# Patient Record
Sex: FEMALE | Race: WHITE | NOT HISPANIC OR LATINO | Employment: OTHER | URBAN - METROPOLITAN AREA
[De-identification: names, ages, dates, MRNs, and addresses within clinical notes are randomized per-mention and may not be internally consistent; named-entity substitution may affect disease eponyms.]

---

## 2024-06-17 ENCOUNTER — OFFICE VISIT (OUTPATIENT)
Age: 59
End: 2024-06-17
Payer: COMMERCIAL

## 2024-06-17 ENCOUNTER — APPOINTMENT (OUTPATIENT)
Age: 59
End: 2024-06-17
Payer: COMMERCIAL

## 2024-06-17 VITALS
TEMPERATURE: 97.7 F | DIASTOLIC BLOOD PRESSURE: 96 MMHG | HEIGHT: 65 IN | HEART RATE: 90 BPM | WEIGHT: 139.8 LBS | BODY MASS INDEX: 23.29 KG/M2 | SYSTOLIC BLOOD PRESSURE: 126 MMHG | RESPIRATION RATE: 18 BRPM | OXYGEN SATURATION: 96 %

## 2024-06-17 DIAGNOSIS — R07.81 RIB PAIN ON LEFT SIDE: ICD-10-CM

## 2024-06-17 DIAGNOSIS — S22.32XA CLOSED FRACTURE OF ONE RIB OF LEFT SIDE, INITIAL ENCOUNTER: Primary | ICD-10-CM
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
## 2024-06-17 PROCEDURE — G0383 LEV 4 HOSP TYPE B ED VISIT: HCPCS | Performed by: NURSE PRACTITIONER

## 2024-06-17 PROCEDURE — 71101 X-RAY EXAM UNILAT RIBS/CHEST: CPT

## 2024-06-17 PROCEDURE — S9083 URGENT CARE CENTER GLOBAL: HCPCS | Performed by: NURSE PRACTITIONER

## 2024-06-17 NOTE — PROGRESS NOTES
[facility]        NAME: Yumi Whatley is a 58 y.o. female  : 1965    MRN: [id number]  DATE: 2024  TIME: 1:43 PM    Assessment and Plan   Closed fracture of one rib of left side, initial encounter [S22.32XA]  1. Closed fracture of one rib of left side, initial encounter        2. Rib pain on left side  XR ribs left w pa chest min 3 views      3. Fall, initial encounter          Acute symptomatic occurring following a fall that happened getting onto a boat on Saturday.  Bruising plus swelling present TTP on palpation left rib area.  Wet read x-ray positive for individual rib fracture, will heed rad review. Will recommend incentive spirometer, ibuprofen/Tylenol as needed for pain, continue to ice as needed. Discussed with patient that in this setting we are unable to evaluate for organ damage that she should f/u with her pcp for further eval with labwork and possible imaging if concerns arise. F/u pcp 3-5 days or as needed earlier.    Patient Instructions       Follow up with PCP in 3-5 days.  Proceed to  ER if symptoms worsen.    If tests have been performed at [facility], our office will contact you with results if changes need to be made to the care plan discussed with you at the visit.  You can review your full results on [facility].    Chief Complaint     Chief Complaint   Patient presents with   • Left rib pain     Was stepping onto a boat, the boat moved and she fell into the door. Shallow breathing due to pain. Fall was Saturday. Pain is 7/10 after 2 aleve this morning.         History of Present Illness       Patient is a 58-year-old female arrives with complaints of left-sided rib pain.  She reports on Saturday she was attempting to get onto a boat when somebody stepped on before her the boat rocked she attempted to step over a rope and she tripped over it she fell into the door.  She does have bruising some swelling present to left sided rib area pain worse with deep  "breaths.  She denies any anticoagulant use.  Denies any osteopenia or osteoporosis.        Review of Systems   Review of Systems   Constitutional:  Negative for activity change, appetite change, chills, fatigue and fever.   HENT:  Negative for congestion, postnasal drip, rhinorrhea, sneezing and sore throat.    Respiratory:  Negative for cough, chest tightness, shortness of breath and wheezing.    Cardiovascular:  Negative for chest pain and palpitations.   Gastrointestinal:  Negative for abdominal pain, constipation, diarrhea, nausea and vomiting.   Musculoskeletal:  Positive for arthralgias and myalgias.   Skin:  Positive for color change. Negative for pallor and rash.   Neurological:  Negative for dizziness, weakness, light-headedness and headaches.   Hematological:  Negative for adenopathy.   Psychiatric/Behavioral:  Negative for agitation and confusion.          Current Medications     No current outpatient medications on file.    Current Allergies     Allergies as of 06/17/2024   • (No Known Allergies)            The following portions of the patient's history were reviewed and updated as appropriate: allergies, current medications, past family history, past medical history, past social history, past surgical history and problem list.     History reviewed. No pertinent past medical history.    History reviewed. No pertinent surgical history.    Family History   Problem Relation Age of Onset   • No Known Problems Mother    • No Known Problems Father          Medications have been verified.        Objective   /96   Pulse 90   Temp 97.7 °F (36.5 °C) (Tympanic)   Resp 18   Ht 5' 4.75\" (1.645 m)   Wt 63.4 kg (139 lb 12.8 oz)   SpO2 96%   BMI 23.44 kg/m²   No LMP recorded.       Physical Exam     Physical Exam  Vitals and nursing note reviewed.   Constitutional:       General: She is not in acute distress.     Appearance: Normal appearance. She is not ill-appearing or diaphoretic.   HENT:      Head: " Normocephalic and atraumatic.      Nose: No congestion or rhinorrhea.   Eyes:      General: No scleral icterus.        Right eye: No discharge.         Left eye: No discharge.   Pulmonary:      Effort: Pulmonary effort is normal. No respiratory distress.   Chest:      Chest wall: Swelling and tenderness present.          Comments: TTP to palpation with bruising that progresses downward.  Musculoskeletal:         General: Swelling, tenderness and signs of injury present. Normal range of motion.      Cervical back: Normal range of motion.   Skin:     Coloration: Skin is not jaundiced or pale.      Findings: No bruising, erythema or rash.   Neurological:      General: No focal deficit present.      Mental Status: She is alert and oriented to person, place, and time.   Psychiatric:         Mood and Affect: Mood normal.         Behavior: Behavior normal.         Thought Content: Thought content normal.         Judgment: Judgment normal.